# Patient Record
Sex: MALE | Race: OTHER | NOT HISPANIC OR LATINO | URBAN - METROPOLITAN AREA
[De-identification: names, ages, dates, MRNs, and addresses within clinical notes are randomized per-mention and may not be internally consistent; named-entity substitution may affect disease eponyms.]

---

## 2023-07-29 ENCOUNTER — EMERGENCY (EMERGENCY)
Facility: HOSPITAL | Age: 23
LOS: 1 days | Discharge: HOME | End: 2023-07-29
Attending: EMERGENCY MEDICINE | Admitting: EMERGENCY MEDICINE
Payer: SELF-PAY

## 2023-07-29 VITALS
WEIGHT: 158.07 LBS | RESPIRATION RATE: 18 BRPM | SYSTOLIC BLOOD PRESSURE: 119 MMHG | HEART RATE: 90 BPM | TEMPERATURE: 98 F | HEIGHT: 69 IN | OXYGEN SATURATION: 98 % | DIASTOLIC BLOOD PRESSURE: 70 MMHG

## 2023-07-29 PROCEDURE — 99285 EMERGENCY DEPT VISIT HI MDM: CPT

## 2023-07-29 PROCEDURE — 99053 MED SERV 10PM-8AM 24 HR FAC: CPT

## 2023-07-29 RX ORDER — IBUPROFEN 200 MG
600 TABLET ORAL ONCE
Refills: 0 | Status: COMPLETED | OUTPATIENT
Start: 2023-07-29 | End: 2023-07-29

## 2023-07-29 NOTE — ED ADULT NURSE NOTE - NSFALLRISKINTERV_ED_ALL_ED

## 2023-07-29 NOTE — ED ADULT NURSE NOTE - OBJECTIVE STATEMENT
pt c/o tripping and fell striking his face, hands and knees on the concrete.  abrasion to right knee and right elbow, laceration inside upper lip and 2  upper front fractured teeth.   does not have tooth pieces.   no loc.   pt admits to drinking.

## 2023-07-30 PROCEDURE — 73080 X-RAY EXAM OF ELBOW: CPT | Mod: 26,RT

## 2023-07-30 PROCEDURE — 70486 CT MAXILLOFACIAL W/O DYE: CPT | Mod: MA

## 2023-07-30 PROCEDURE — 73562 X-RAY EXAM OF KNEE 3: CPT

## 2023-07-30 PROCEDURE — 99284 EMERGENCY DEPT VISIT MOD MDM: CPT | Mod: 25

## 2023-07-30 PROCEDURE — 73562 X-RAY EXAM OF KNEE 3: CPT | Mod: 26,RT

## 2023-07-30 PROCEDURE — 70450 CT HEAD/BRAIN W/O DYE: CPT | Mod: MA

## 2023-07-30 PROCEDURE — 70450 CT HEAD/BRAIN W/O DYE: CPT | Mod: 26,MA

## 2023-07-30 PROCEDURE — 73080 X-RAY EXAM OF ELBOW: CPT

## 2023-07-30 PROCEDURE — 70486 CT MAXILLOFACIAL W/O DYE: CPT | Mod: 26,MA

## 2023-07-30 RX ORDER — IBUPROFEN 200 MG
1 TABLET ORAL
Qty: 20 | Refills: 0
Start: 2023-07-30

## 2023-07-30 RX ORDER — BACITRACIN ZINC 500 UNIT/G
1 OINTMENT IN PACKET (EA) TOPICAL
Qty: 1 | Refills: 0
Start: 2023-07-30 | End: 2023-08-05

## 2023-07-30 RX ADMIN — Medication 600 MILLIGRAM(S): at 00:07

## 2023-07-30 NOTE — ED PROVIDER NOTE - CLINICAL SUMMARY MEDICAL DECISION MAKING FREE TEXT BOX
s/p trip and fall, right upper tooth fracture, inner upper lip laceration - no sutures required. multiple abrasions. tetanus UTD.  -check CT  -check xrays  abrasions cleansed with saline, dressed with bacitracin and gauze

## 2023-07-30 NOTE — ED PROVIDER NOTE - OBJECTIVE STATEMENT
23M no PMH s/p trip and fall. pt states he was walking outside and accidentally fell forward. +hit face, right knee and right elbow. +abrasions. no LOC. no vomiting. no dizziness. +broke tooth, laceration to inner upper lip, +nose bleed. admits to drinking alcohol tonight.

## 2023-07-30 NOTE — ED PROVIDER NOTE - PROGRESS NOTE DETAILS
no fractures on CT or xray. continue bacitracin bid to abrasions. f/u with dental/omfs for tooth fracture  I have discussed the discharge plan with the patient. The patient agrees with the plan, as discussed.  The patient understands Emergency Department diagnosis is a preliminary diagnosis often based on limited information and that the patient must adhere to the follow-up plan as discussed.  The patient understands that if the symptoms worsen or if prescribed medications do not have the desired/planned effect that the patient may return to the Emergency Department at any time for further evaluation and treatment.

## 2023-07-30 NOTE — ED PROVIDER NOTE - CARE PLAN
Principal Discharge DX:	Fall  Secondary Diagnosis:	Multiple abrasions  Secondary Diagnosis:	Tooth fracture   1

## 2023-07-30 NOTE — ED PROVIDER NOTE - NSFOLLOWUPINSTRUCTIONS_ED_ALL_ED_FT
Follow-up with dentist/[facility] for tooth fracture    Tooth Injuries  Tooth injuries are injuries that happen because a strong force:  Cracked a tooth.  Moved a tooth out of place.  Knocked a tooth out of the mouth.  A tooth injury needs to be treated quickly to save the tooth.    What are the causes?  This condition may be caused by:  Anything that chips or breaks a tooth.  Anything that moves a tooth out of its place or knocks it out of the mouth.  The injuries may come from accidents, falls, or fights.    What increases the risk?  Playing sports without using a mouth guard. These sports include football or boxing.  Medical conditions that cause falling or fainting.  Anything that injures the face.  Medical conditions that weaken the root of the tooth.    What are the signs or symptoms?  The main symptoms of this condition are:  A tooth that has moved out of its place.  A tooth that has moved into the gums or out of the gums.  A tooth that you can no longer see because it is badly broken.  Other symptoms may include:  Pain when chewing.  A loose tooth.  Bleeding in the tooth.  Swelling or bruising of the tooth or lip.  Pain in the tooth when you eat or drink something cold or hot.    How is this treated?  Treatment includes:  Replacing a tooth fragment with a filling, a cap, or a hard cover (crown).  Repairing the inside of the tooth (root canal).  Putting the tooth back in its place, if it moved.  Using a brace or splint to hold the tooth in place.  Replacing the tooth that was knocked out of the mouth and then doing a root canal.  Removing the tooth completely, if it cannot be saved.  Taking medicine, including:  Medicine for pain.  Medicine to prevent infection.    Follow these instructions at home:    Medicines    Take over-the-counter and prescription medicines only as told by your doctor.  If you were prescribed an antibiotic medicine, take it as told by your doctor. Do not stop taking it even if you start to feel better.  Do not drive or use heavy machines while taking prescription pain medicine.    Managing pain and swelling    If told, put ice on your mouth near the injured tooth. To do this:  Put ice in a plastic bag.  Place a towel between your skin and the bag.  Leave the ice on for 20 minutes, 2–3 times a day.  Take off the ice if your skin turns bright red. This is very important. If you cannot feel pain, heat, or cold, you have a greater risk of damage to the area.  Gargle with a salt-water mixture 3–4 times a day. To make this, dissolve ½–1 tsp of salt in 1 cup of warm water.    Caring for your teeth  Do not eat or chew on very hard objects. These include ice cubes, pens, pencils, hard candy, and popcorn.  Do not use your teeth to open packages.  Do not clench or grind your teeth. Tell your doctor if you grind your teeth while you sleep.  Brush your teeth gently as told by your doctor.  Eat only soft foods as told by your doctor.  Always wear a mouth guard when you play contact sports.    General instructions    Do not use any products that contain nicotine or tobacco. These products include cigarettes, chewing tobacco, and vaping devices, such as e-cigarettes. These can delay incision healing after surgery. If you need help quitting, ask your health care provider.  Check the injured area every day for signs of infection. Watch for:  Redness, swelling, or pain.  Fluid, blood, or pus.  Keep all follow-up visits.    Contact a doctor if:  You continue to have tooth pain after taking medicine.  You have pus near the injured tooth.  You develop swelling near your injured tooth.  You have a tooth splint and it becomes loose.  You have a loose tooth.    Get help right away if:  Your face swells.  You have a fever.  You have bleeding near the tooth and it does not stop after 10 minutes.  You have trouble swallowing.  You are not able to open your mouth.  Your tooth comes out.    Summary  Tooth injuries are injuries that happen because something cracked the tooth or knocked it out of the mouth.  Causes of this injury include accidents, falls, or fights.  Treatment may need to be done quickly to save your tooth.  Your doctor will tell you how to care for your injured tooth. He or she will tell you how to take medicines and how to check for infection.  Call your doctor if there is bleeding or swelling near the injured tooth, or if you have a fever.  This information is not intended to replace advice given to you by your health care provider. Make sure you discuss any questions you have with your health care provider.    Abrasion  An abrasion is a cut or a scrape on your skin. You must take care of your wound so germs do not get in it and cause infection.    What are the causes?  This condition is caused by rubbing your skin on something or falling on a surface, such as the ground. When your skin rubs on something, some layers of skin may rub off.    What are the signs or symptoms?  A cut or a scrape.  Bleeding.  A red or pink spot.  A bruise under your wound.    How is this treated?  This condition may be treated with:  Cleaning your wound.  Putting ointment on your wound.  Putting a bandage on your wound.  Getting a tetanus shot.  Follow these instructions at home:  Your doctor may tell you to do these things:    Medicines    Take or use over-the-counter and prescription medicines only as told by your doctor.  If you were prescribed an antibiotic medicine, use it as told by your doctor. Do not stop using it even if you start to feel better.  Keep your wound clean    Clean your wound 1 or 2 times a day or as often told by your doctor. To do this:  Wash your hands for at least 20 seconds with mild soap and water. Do this before and after you clean your wound.  Wash your wound with mild soap and water.  Rinse off the soap.  Pat your wound with a clean towel to dry it. Do not rub your wound.  Keep your bandage clean and dry. Take it off and change it as told by your doctor.  You may have to change your bandage one or more times a day, or as told by your doctor.  Watch for signs of infection    Check your wound every day for signs of infection. Check for:  A red streak that goes away from your wound.  Other redness.  Swelling or more pain.  Warmth.  Blood, fluid, pus, or a bad smell.  Treat pain and swelling    If told, put ice on the injured area. To do this:  Put ice in a plastic bag.  Place a towel between your skin and the bag.  Leave the ice on for 20 minutes, 2–3 times a day.  Take off the ice if your skin turns bright red. This is very important. If you cannot feel pain, heat, or cold, you have a greater risk of damage to the area.  If you can, raise the injured area above the level of your heart while you are sitting or lying down.  General instructions    Do not take baths, swim, or use a hot tub. Ask your doctor about taking showers or sponge baths.  Keep all follow-up visits.    Contact a doctor if:  You had a tetanus shot, and you have any of these where the needle went in:  Swelling.  Very bad pain.  Redness.  Bleeding.  You have a lot of pain, and medicine does not help.  You have a fever.  You have any of these signs of infection in your wound:  Redness, swelling, or more pain.  Blood, fluid, pus, or a bad smell.  Warmth.    Get help right away if:  You have a red streak going away from your wound.    Summary  An abrasion is a cut or a scrape on your skin. Take care of your wound so germs do not get in it.  Clean your wound 1 or 2 times a day or as often as told. Change your bandage as told and use medicines as told.  Call your doctor if you have a fever or if you have redness, swelling, or more pain in your wound.  Call your doctor if you have blood, fluid, pus, or a bad smell in your wound.  Get help right away if you have a red streak going away from your wound.  This information is not intended to replace advice given to you by your health care provider. Make sure you discuss any questions you have with your health care provider.

## 2023-07-30 NOTE — ED PROVIDER NOTE - PHYSICAL EXAMINATION
abrasion to right elbow - no pain to olecranon, FROM 2+radial  abrasion to right knee - FROM, anterior knee ttp, no med/lat laxity

## 2023-07-30 NOTE — ED PROVIDER NOTE - PATIENT PORTAL LINK FT
You can access the FollowMyHealth Patient Portal offered by [facility] by registering at the following website: [email or web address]. By joining [facility]’s FollowMyHealth portal, you will also be able to view your health information using other applications (apps) compatible with our system.

## 2023-07-30 NOTE — ED PROVIDER NOTE - ENMT, MLM
Airway patent, blood to b/l nare, no trismus, no drooling. #8 tooth fractured, 0.5cm upper inner lip laceration, +upper lip swelling  +swelling ot bridge of nose, +abrasion to left upper nose

## 2023-07-30 NOTE — ED PROVIDER NOTE - PROVIDER TOKENS
PROVIDER:[TOKEN:[[id number]]],PROVIDER:[TOKEN:[[id number]]],PROVIDER:[TOKEN:[[id number]]],PROVIDER:[TOKEN:[[id number]]]

## 2023-07-30 NOTE — ED PROVIDER NOTE - CARE PROVIDER_API CALL
Hal Villagran  Oral/Maxillofacial Surgery  [address] [place] [address], [address]  [place] [address]  Phone: [phone]  Fax: [phone]  Follow Up Time:     Sarbjit Mccoy  Oral/Maxillofacial Surgery  [address] [place] [address], [address]  [place] [address]  Phone: [phone]  Fax: [phone]  Follow Up Time:     George Andrea  Oral/Maxillofacial Surgery  [address] [place] [address], [address]  [place] [address]  Phone: [phone]  Fax: [phone]  Follow Up Time:     Keron Pedraza  Oral/Maxillofacial Surgery  [address] [place] [address], [address] [place] [address]  Phone: [phone]  Fax: [phone]  Follow Up Time:

## 2023-08-02 DIAGNOSIS — R04.0 EPISTAXIS: ICD-10-CM

## 2023-08-02 DIAGNOSIS — S01.511A LACERATION WITHOUT FOREIGN BODY OF LIP, INITIAL ENCOUNTER: ICD-10-CM

## 2023-08-02 DIAGNOSIS — S80.211A ABRASION, RIGHT KNEE, INITIAL ENCOUNTER: ICD-10-CM

## 2023-08-02 DIAGNOSIS — S02.5XXA FRACTURE OF TOOTH (TRAUMATIC), INITIAL ENCOUNTER FOR CLOSED FRACTURE: ICD-10-CM

## 2023-08-02 DIAGNOSIS — S50.311A ABRASION OF RIGHT ELBOW, INITIAL ENCOUNTER: ICD-10-CM
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
## 2025-04-24 NOTE — ED ADULT NURSE NOTE - CAS DISCH TRANSFER METHOD
Pt called in stating ambien needing PA  
Pt notified and letter generated and faxed to her employer.   
Spoke with patient and she will be picking up the new ambien dose today. She stated that she is really struggling and ended up leaving work today. She is asking if you can do a note for work today and she is going to try and go back tomorrow if she can get some rest.   
Yes, that is fine  
Walking